# Patient Record
Sex: FEMALE | Race: WHITE | NOT HISPANIC OR LATINO | Employment: FULL TIME | URBAN - NONMETROPOLITAN AREA
[De-identification: names, ages, dates, MRNs, and addresses within clinical notes are randomized per-mention and may not be internally consistent; named-entity substitution may affect disease eponyms.]

---

## 2021-06-28 ENCOUNTER — OFFICE VISIT (OUTPATIENT)
Dept: CARDIOLOGY | Facility: CLINIC | Age: 56
End: 2021-06-28

## 2021-06-28 VITALS
TEMPERATURE: 97 F | HEIGHT: 67 IN | HEART RATE: 61 BPM | RESPIRATION RATE: 12 BRPM | DIASTOLIC BLOOD PRESSURE: 74 MMHG | SYSTOLIC BLOOD PRESSURE: 130 MMHG | BODY MASS INDEX: 40.81 KG/M2 | OXYGEN SATURATION: 92 % | WEIGHT: 260 LBS

## 2021-06-28 DIAGNOSIS — E78.00 HYPERCHOLESTEROLEMIA: Primary | ICD-10-CM

## 2021-06-28 DIAGNOSIS — I10 ESSENTIAL HYPERTENSION: ICD-10-CM

## 2021-06-28 PROCEDURE — 99213 OFFICE O/P EST LOW 20 MIN: CPT | Performed by: INTERNAL MEDICINE

## 2021-06-28 PROCEDURE — 93000 ELECTROCARDIOGRAM COMPLETE: CPT | Performed by: INTERNAL MEDICINE

## 2021-06-28 RX ORDER — HYDROCHLOROTHIAZIDE 50 MG/1
50 TABLET ORAL DAILY
Qty: 90 TABLET | Refills: 3 | Status: SHIPPED | OUTPATIENT
Start: 2021-06-28 | End: 2022-07-19

## 2021-06-28 RX ORDER — LOSARTAN POTASSIUM 100 MG/1
100 TABLET ORAL DAILY
Qty: 90 TABLET | Refills: 3 | Status: SHIPPED | OUTPATIENT
Start: 2021-06-28 | End: 2022-07-11

## 2021-06-28 RX ORDER — ROPINIROLE 0.5 MG/1
0.5 TABLET, FILM COATED ORAL DAILY
COMMUNITY
Start: 2021-04-15

## 2021-06-28 NOTE — PROGRESS NOTES
[facility] [place]  [facility] CARDIOLOGY  [address] [place] [address]  Dept: [phone]  Dept Fax: [phone]    Dana Arroyo  1965    Follow Up Office Visit Note    History of Present Illness:  Dana Arroyo is a 56 y.o. female who presents to the clinic for Follow-up. For Hypertension and Hyperlipidemia --She seems doing well, no complaint, no chest pain, palpitations, her BP is good 125.75 EKG sinus, she has had lab per patent with normal results, no available to me now,     The following portions of the patient's history were reviewed and updated as appropriate: allergies, current medications, past family history, past medical history, past social history, past surgical history and problem list.    Medications:  hydroCHLOROthiazide  losartan  rOPINIRole    Subjective  Allergies   Allergen Reactions   • Penicillins Rash        Past Medical History:   Diagnosis Date   • Benign hypertension    • BMI 37.0-37.9, adult    • Familial hypercholesteremia    • Hypertensive disorder    • Intermittent palpitations    • Mixed hyperlipidemia    • Sleep apnea        History reviewed. No pertinent surgical history.    Family History   Problem Relation Age of Onset   • Hyperlipidemia Mother    • Hypertension Mother    • Stroke Mother         Social History     Socioeconomic History   • Marital status:      Spouse name: Not on file   • Number of children: Not on file   • Years of education: Not on file   • Highest education level: Not on file   Tobacco Use   • Smoking status: Never Smoker   • Smokeless tobacco: Never Used   Substance and Sexual Activity   • Alcohol use: Yes   • Drug use: Never   • Sexual activity: Defer       Review of Systems   All other systems reviewed and are negative.      Cardiovascular Procedures    ECHO/MUGA:   STRESS TESTS:   CARDIAC CATH:   DEVICES:   HOLTER:   CT/MRI:   VASCULAR:   CARDIOTHORACIC:     Objective  Vitals:    06/28/21 1005   BP: 130/74  "  BP Location: Left arm   Patient Position: Sitting   Cuff Size: Adult   Pulse: 61   Resp: 12   Temp: 97 °F (36.1 °C)   TempSrc: Infrared   SpO2: 92%   Weight: 118 kg (260 lb)   Height: 170.2 cm (67\")   PainSc: 0-No pain     Body mass index is 40.72 kg/m².     Physical Exam  Constitutional:       Appearance: Healthy appearance. Not in distress.   Neck:      Vascular: No JVR. JVD normal.   Pulmonary:      Effort: Pulmonary effort is normal.      Breath sounds: Normal breath sounds. No wheezing. No rhonchi. No rales.   Chest:      Chest wall: Not tender to palpatation.   Cardiovascular:      PMI at left midclavicular line. Normal rate. Regular rhythm. Normal S1. Normal S2.      Murmurs: There is no murmur.      No gallop. No click. No rub.   Pulses:     Intact distal pulses.   Edema:     Peripheral edema absent.   Abdominal:      General: Bowel sounds are normal.      Palpations: Abdomen is soft.      Tenderness: There is no abdominal tenderness.   Musculoskeletal: Normal range of motion.         General: No tenderness. Skin:     General: Skin is warm and dry.   Neurological:      General: No focal deficit present.      Mental Status: Alert and oriented to person, place and time.          Diagnostic Data    ECG 12 Lead    Date/Time: 6/28/2021 10:29 AM  Performed by: Omar Shields MD  Authorized by: Omar Shields MD   Comparison: compared with previous ECG   Rhythm: sinus rhythm  Rate: normal  BPM: 71  QRS axis: normal    Clinical impression: normal ECG            Assessment and Plan  Hypertension- The BP is fine, will keep same med's Losartan and HCTZ   Hyperlipidemia - on  No med's she refuses     No follow-ups on file.    Omar Shields MD  06/28/2021  "

## 2022-03-29 ENCOUNTER — OFFICE VISIT (OUTPATIENT)
Dept: CARDIOLOGY | Facility: CLINIC | Age: 57
End: 2022-03-29

## 2022-03-29 VITALS
BODY MASS INDEX: 45.99 KG/M2 | HEART RATE: 60 BPM | DIASTOLIC BLOOD PRESSURE: 74 MMHG | TEMPERATURE: 97.1 F | OXYGEN SATURATION: 97 % | RESPIRATION RATE: 15 BRPM | WEIGHT: 293 LBS | SYSTOLIC BLOOD PRESSURE: 122 MMHG | HEIGHT: 67 IN

## 2022-03-29 DIAGNOSIS — E78.00 HYPERCHOLESTEROLEMIA: ICD-10-CM

## 2022-03-29 DIAGNOSIS — I10 ESSENTIAL HYPERTENSION: Primary | ICD-10-CM

## 2022-03-29 DIAGNOSIS — Z01.810 PREOP CARDIOVASCULAR EXAM: ICD-10-CM

## 2022-03-29 PROCEDURE — 99214 OFFICE O/P EST MOD 30 MIN: CPT | Performed by: INTERNAL MEDICINE

## 2022-03-29 PROCEDURE — 93000 ELECTROCARDIOGRAM COMPLETE: CPT | Performed by: INTERNAL MEDICINE

## 2022-03-29 RX ORDER — DICLOFENAC SODIUM 75 MG/1
75 TABLET, DELAYED RELEASE ORAL 2 TIMES DAILY
COMMUNITY
Start: 2022-03-09 | End: 2022-08-12

## 2022-03-29 NOTE — PROGRESS NOTES
[facility] [place]  [facility] CARDIOLOGY  [address] [place] [address]  Dept: [phone]  Dept Fax: [phone]    Dana Marshall  1965    Follow Up Office Visit Note    History of Present Illness:  aDna Marshall is a 57 y.o. female who presents to the clinic for  Prop cardiovascular evaluation- She denies any complaints, planning to have bariatric surgery, no chest pain, no SOB, BP is 120.80,  Her cardiac exam is normal, EKG sinus normal EKG HR 70, her Lipids are very high LDL is 201, she has refused medical treatment in the past and now, she has ehsan hyercholesterolemia, and advised  again to take meds but she refuses, she is a low risk for any surgical procedures    The following portions of the patient's history were reviewed and updated as appropriate: allergies, current medications, past family history, past medical history, past social history, past surgical history and problem list.    Medications:  diclofenac  hydroCHLOROthiazide  losartan  rOPINIRole    Subjective  Allergies   Allergen Reactions   • Penicillins Rash        Past Medical History:   Diagnosis Date   • Benign hypertension    • BMI 37.0-37.9, adult    • Familial hypercholesteremia    • Hypertensive disorder    • Intermittent palpitations    • Mixed hyperlipidemia    • Sleep apnea        History reviewed. No pertinent surgical history.    Family History   Problem Relation Age of Onset   • Hyperlipidemia Mother    • Hypertension Mother    • Stroke Mother         Social History     Socioeconomic History   • Marital status:    Tobacco Use   • Smoking status: Never Smoker   • Smokeless tobacco: Never Used   Substance and Sexual Activity   • Alcohol use: Yes   • Drug use: Never   • Sexual activity: Defer       Review of Systems   Constitutional: Negative.    HENT: Negative.    Respiratory: Negative.    Cardiovascular: Negative.    Endocrine: Negative.    Genitourinary: Negative.   "  Musculoskeletal: Negative.    Skin: Negative.    Allergic/Immunologic: Negative.    Neurological: Negative.    Hematological: Negative.    Psychiatric/Behavioral: Negative.    All other systems reviewed and are negative.      Cardiovascular Procedures    ECHO/MUGA:   STRESS TESTS:   CARDIAC CATH:   DEVICES:   HOLTER:   CT/MRI:   VASCULAR:   CARDIOTHORACIC:     Objective  Vitals:    03/29/22 0905   BP: 122/74   BP Location: Right arm   Patient Position: Sitting   Cuff Size: Large Adult   Pulse: 60   Resp: 15   Temp: 97.1 °F (36.2 °C)   TempSrc: Infrared   SpO2: 97%   Weight: 133 kg (294 lb)   Height: 170.2 cm (67\")   PainSc: 0-No pain     Body mass index is 46.05 kg/m².     Physical Exam  Constitutional:       Appearance: Healthy appearance. Not in distress.   Neck:      Vascular: No JVR. JVD normal.   Pulmonary:      Effort: Pulmonary effort is normal.      Breath sounds: Normal breath sounds. No wheezing. No rhonchi. No rales.   Chest:      Chest wall: Not tender to palpatation.   Cardiovascular:      PMI at left midclavicular line. Normal rate. Regular rhythm. Normal S1. Normal S2.      Murmurs: There is no murmur.      No gallop. No click. No rub.   Pulses:     Intact distal pulses.   Edema:     Peripheral edema absent.   Abdominal:      General: Bowel sounds are normal.      Palpations: Abdomen is soft.      Tenderness: There is no abdominal tenderness.   Musculoskeletal: Normal range of motion.         General: No tenderness. Skin:     General: Skin is warm and dry.   Neurological:      General: No focal deficit present.      Mental Status: Alert and oriented to person, place and time.          Diagnostic Data    ECG 12 Lead    Date/Time: 3/29/2022 10:38 AM  Performed by: Omar Shields MD  Authorized by: Omar Shields MD   Comparison: compared with previous ECG from 6/28/2021  Similar to previous ECG  Rhythm: sinus rhythm  Rate: normal  BPM: 70  QRS axis: normal  Other findings: low " voltage    Clinical impression: normal ECG            Assessment and Plan  Diagnoses and all orders for this visit:    Essential hypertension- BP is good on Losartan 100 mg and HCTZ  BP is 120.80.     Hypercholesterolemia- Rickey- LDL is 201, refuses medication, risk of stroke and mi, were explained to the patient     Preop cardiovascular exam- She is asymptomatic, BP is good, low risk for any surgical procedure,   Obesity - she is planning to have bariatric surgery    Other orders  -     diclofenac (VOLTAREN) 75 MG EC tablet; Take 75 mg by mouth 2 (Two) Times a Day.         Return in about 1 year (around 3/29/2023) for Recheck.    Omar Shields MD  03/29/2022

## 2022-07-10 DIAGNOSIS — I10 ESSENTIAL HYPERTENSION: ICD-10-CM

## 2022-07-11 RX ORDER — LOSARTAN POTASSIUM 100 MG/1
TABLET ORAL
Qty: 90 TABLET | Refills: 1 | Status: SHIPPED | OUTPATIENT
Start: 2022-07-11 | End: 2022-08-12

## 2022-07-19 DIAGNOSIS — I10 ESSENTIAL HYPERTENSION: ICD-10-CM

## 2022-07-19 RX ORDER — HYDROCHLOROTHIAZIDE 50 MG/1
TABLET ORAL
Qty: 90 TABLET | Refills: 1 | Status: SHIPPED | OUTPATIENT
Start: 2022-07-19 | End: 2022-08-12

## 2022-08-12 ENCOUNTER — OFFICE VISIT (OUTPATIENT)
Dept: CARDIOLOGY | Facility: CLINIC | Age: 57
End: 2022-08-12

## 2022-08-12 VITALS
SYSTOLIC BLOOD PRESSURE: 122 MMHG | TEMPERATURE: 98 F | DIASTOLIC BLOOD PRESSURE: 76 MMHG | HEART RATE: 72 BPM | OXYGEN SATURATION: 98 % | WEIGHT: 275 LBS | HEIGHT: 67 IN | BODY MASS INDEX: 43.16 KG/M2 | RESPIRATION RATE: 16 BRPM

## 2022-08-12 DIAGNOSIS — E78.00 HYPERCHOLESTEROLEMIA: ICD-10-CM

## 2022-08-12 DIAGNOSIS — I10 ESSENTIAL HYPERTENSION: Primary | ICD-10-CM

## 2022-08-12 PROCEDURE — 93000 ELECTROCARDIOGRAM COMPLETE: CPT | Performed by: INTERNAL MEDICINE

## 2022-08-12 PROCEDURE — 99213 OFFICE O/P EST LOW 20 MIN: CPT | Performed by: INTERNAL MEDICINE

## 2022-08-12 RX ORDER — ESTRADIOL 0.1 MG/G
CREAM VAGINAL
COMMUNITY
Start: 2022-07-19

## 2022-08-12 RX ORDER — OMEPRAZOLE 20 MG/1
20 CAPSULE, DELAYED RELEASE ORAL DAILY
COMMUNITY
Start: 2022-07-22 | End: 2023-03-20 | Stop reason: ALTCHOICE

## 2022-08-12 NOTE — PROGRESS NOTES
[facility] [place]  [facility] CARDIOLOGY  [address] [place] [address]  Dept: [phone]  Dept Fax: [phone]    Dana Marshall  1965    Follow Up Office Visit Note    History of Present Illness:  Dana Marshall is a 57 y.o. female who presents to the clinic for Follow-up. Hypertension- She has lost 20 pounds after bariatric surgery, her BP have dropped and she stopped 2 weeks ago, Losartan and HCTZ, her BP here is 120.80, advised not to take the meds for now keep checking the BP     The following portions of the patient's history were reviewed and updated as appropriate: allergies, current medications, past family history, past medical history, past social history, past surgical history and problem list.    Medications:  estradiol  omeprazole  rOPINIRole    Subjective  Allergies   Allergen Reactions   • Penicillins Rash        Past Medical History:   Diagnosis Date   • Benign hypertension    • BMI 37.0-37.9, adult    • Familial hypercholesteremia    • Hypertensive disorder    • Intermittent palpitations    • Mixed hyperlipidemia    • Sleep apnea        History reviewed. No pertinent surgical history.    Family History   Problem Relation Age of Onset   • Hyperlipidemia Mother    • Hypertension Mother    • Stroke Mother         Social History     Socioeconomic History   • Marital status:    Tobacco Use   • Smoking status: Never Smoker   • Smokeless tobacco: Never Used   Substance and Sexual Activity   • Alcohol use: Yes   • Drug use: Never   • Sexual activity: Defer       Review of Systems   Constitutional: Negative.    HENT: Negative.    Respiratory: Negative.    Cardiovascular: Negative.    Endocrine: Negative.    Genitourinary: Negative.    Musculoskeletal: Negative.    Skin: Negative.    Allergic/Immunologic: Negative.    Neurological: Negative.    Hematological: Negative.    Psychiatric/Behavioral: Negative.        Cardiovascular  "Procedures    ECHO/MUGA:   STRESS TESTS:   CARDIAC CATH:   DEVICES:   HOLTER:   CT/MRI:   VASCULAR:   CARDIOTHORACIC:     Objective  Vitals:    08/12/22 0846   BP: 122/76   BP Location: Left arm   Patient Position: Lying   Cuff Size: Adult   Pulse: 72   Resp: 16   Temp: 98 °F (36.7 °C)   TempSrc: Infrared   SpO2: 98%   Weight: 125 kg (275 lb)   Height: 170.2 cm (67\")   PainSc:   6   PainLoc: Back     Body mass index is 43.07 kg/m².     Physical Exam  Constitutional:       Appearance: Healthy appearance. Not in distress.   Neck:      Vascular: No JVR. JVD normal.   Pulmonary:      Effort: Pulmonary effort is normal.      Breath sounds: Normal breath sounds. No wheezing. No rhonchi. No rales.   Chest:      Chest wall: Not tender to palpatation.   Cardiovascular:      PMI at left midclavicular line. Normal rate. Regular rhythm. Normal S1. Normal S2.      Murmurs: There is no murmur.      No gallop. No click. No rub.   Pulses:     Intact distal pulses.   Edema:     Peripheral edema absent.   Abdominal:      General: Bowel sounds are normal.      Palpations: Abdomen is soft.      Tenderness: There is no abdominal tenderness.   Musculoskeletal: Normal range of motion.         General: No tenderness. Skin:     General: Skin is warm and dry.   Neurological:      General: No focal deficit present.      Mental Status: Alert and oriented to person, place and time.          Diagnostic Data    ECG 12 Lead    Date/Time: 8/12/2022 9:19 AM  Performed by: Omar Shields MD  Authorized by: Omar Shields MD   Comparison: compared with previous ECG from 3/29/2022  Rhythm: sinus rhythm and sinus bradycardia  Rate: bradycardic  BPM: 57  QRS axis: normal    Clinical impression: normal ECG            Assessment and Plan  Diagnoses and all orders for this visit:    Essential hypertension- The BP is 120.80,will d.,c all her meds for now, will check the BP daily    Hypercholesterolemia- ehsan, she refuses to take any " meds  Palpitations- She has had a Holter with some abnormalities few years ago, will try to get those records, she has very occasional some palpitations    Other orders  -     omeprazole (priLOSEC) 20 MG capsule; Take 20 mg by mouth Daily.  -     estradiol (ESTRACE) 0.1 MG/GM vaginal cream; APPLY PEA SIZE AMOUNT TO VAGINAL OPENING AT BEDTIME FOR 2 WEEKS THEN GO TO TWICE WEEKLY         Return in about 6 months (around 2/12/2023) for Recheck.    Omar Shields MD  08/12/2022

## 2023-03-20 ENCOUNTER — OFFICE VISIT (OUTPATIENT)
Dept: CARDIOLOGY | Facility: CLINIC | Age: 58
End: 2023-03-20
Payer: COMMERCIAL

## 2023-03-20 ENCOUNTER — TELEPHONE (OUTPATIENT)
Dept: CARDIOLOGY | Facility: CLINIC | Age: 58
End: 2023-03-20

## 2023-03-20 VITALS
HEART RATE: 71 BPM | RESPIRATION RATE: 16 BRPM | DIASTOLIC BLOOD PRESSURE: 74 MMHG | HEIGHT: 67 IN | WEIGHT: 247 LBS | SYSTOLIC BLOOD PRESSURE: 128 MMHG | TEMPERATURE: 98 F | BODY MASS INDEX: 38.77 KG/M2 | OXYGEN SATURATION: 98 %

## 2023-03-20 DIAGNOSIS — R73.03 PREDIABETES: ICD-10-CM

## 2023-03-20 DIAGNOSIS — E78.00 HYPERCHOLESTEROLEMIA: ICD-10-CM

## 2023-03-20 DIAGNOSIS — I10 ESSENTIAL HYPERTENSION: Primary | ICD-10-CM

## 2023-03-20 PROCEDURE — 99213 OFFICE O/P EST LOW 20 MIN: CPT

## 2023-03-20 RX ORDER — HYDROCHLOROTHIAZIDE 50 MG/1
50 TABLET ORAL DAILY
Qty: 90 TABLET | Refills: 2 | Status: SHIPPED | OUTPATIENT
Start: 2023-03-20

## 2023-03-20 RX ORDER — HYDROCHLOROTHIAZIDE 50 MG/1
1 TABLET ORAL DAILY
COMMUNITY
Start: 2023-03-02 | End: 2023-03-20 | Stop reason: SDUPTHER

## 2023-03-20 RX ORDER — ASPIRIN 81 MG/1
81 TABLET ORAL DAILY
COMMUNITY

## 2023-03-20 NOTE — TELEPHONE ENCOUNTER
----- Message from ELIAS Sebastian sent at 3/20/2023 10:28 AM EDT -----  Received UPDATED labs 2/6/23    AIC is better 5.5 for diabetes    Also her Hg level has improved from previous lab collection.     Her cholesterol is better but still elevated,  down from >200 a year ago. It is possible we will continue to see improvement in this number with continued weight loss. Continue lifestyle modifications and will watch.       ----- Message -----  From: Edwige Hall MA  Sent: 3/20/2023  10:26 AM EDT  To: ELIAS Sebastian      ----- Message -----  From: Syl Thomson APRN  Sent: 3/20/2023   9:50 AM EDT  To: Edwige Hall MA    I was able to retrieve her labs from September 2022    Her Hg at that time was low 10.6, she will need to follow with PCP or bariatrics for this.     LDL cholesterol is better now 137, was > 200 so much improved, however triglycerides still elevated 173, need to be less than 150. We should see some improvement with continued weight loss    Her A1C for diabetes is now worse, now she is prediabetic 5.8, was 5.5 earlier in year. Also we should see some improvement with this with continued weight loss.

## 2023-03-20 NOTE — PROGRESS NOTES
[facility] [place]  [facility] CARDIOLOGY  [address] [place] [address]  Dept: [phone]  Dept Fax: [phone]    Dana Marshall  1965    Follow Up Office Visit Note    History of Present Illness:  Dana Marshall is a 57 y.o. female who presents to the clinic for Follow-up HTN, HLP. She denies major cardiac complaints today, she did attests to having transient LE mild edema and has continued taking HCTZ about 5 days weekly, state BP has been good at home around 120's systolic, also congruent with today's reading 122/70 on recheck. She has lost 60 total pounds and 30 in the last 6 months. She states to have had recent labs about a month ago. Will attempt to retrieve from [email or web address]. Physical exam today is good. Trace LE swelling. At this time continue current management. Follow 6 months or sooner as indicated.     External labs reviewed [facility] 9/29/22- Hg 10.6, LDL better 137 from 201, trig 173, A1C 5.8    Received UPDATED labs 2/6/23, AIC is better 5.5 for diabetes, Also her Hg level has improved from previous lab collection. Her cholesterol is better but still elevated,  down from >200 a year ago.    The following portions of the patient's history were reviewed and updated as appropriate: allergies, current medications, past family history, past medical history, past social history, past surgical history, and problem list.    Medications:  aspirin  estradiol  hydroCHLOROthiazide  rOPINIRole    Subjective  Allergies   Allergen Reactions   • Penicillins Rash        Past Medical History:   Diagnosis Date   • Benign hypertension    • BMI 37.0-37.9, adult    • Familial hypercholesteremia    • Hypertensive disorder    • Intermittent palpitations    • Mixed hyperlipidemia    • Sleep apnea        History reviewed. No pertinent surgical history.    Family History   Problem Relation Age of Onset   • Hyperlipidemia Mother    • Hypertension Mother    • Stroke Mother  "        Social History     Socioeconomic History   • Marital status:    Tobacco Use   • Smoking status: Never   • Smokeless tobacco: Never   Vaping Use   • Vaping Use: Never used   Substance and Sexual Activity   • Alcohol use: Yes   • Drug use: Never   • Sexual activity: Defer       Review of Systems   Cardiovascular: Positive for leg swelling.   All other systems reviewed and are negative.      Cardiovascular Procedures    ECHO/MUGA:   STRESS TESTS:   CARDIAC CATH:   DEVICES:   HOLTER:   CT/MRI:   VASCULAR:   CARDIOTHORACIC:     Objective  Vitals:    03/20/23 0850   BP: 128/74   BP Location: Right arm   Patient Position: Sitting   Cuff Size: Adult   Pulse: 71   Resp: 16   Temp: 98 °F (36.7 °C)   TempSrc: Infrared   SpO2: 98%   Weight: 112 kg (247 lb)   Height: 170.2 cm (67\")   PainSc:   3   PainLoc: Back     Body mass index is 38.69 kg/m².     Physical Exam  Vitals reviewed.   Constitutional:       General: Awake.      Appearance: Healthy appearance. Not in distress.   Neck:      Vascular: No JVR. JVD normal.   Pulmonary:      Effort: Pulmonary effort is normal.      Breath sounds: Normal breath sounds. No wheezing. No rhonchi. No rales.   Chest:      Chest wall: Not tender to palpatation.   Cardiovascular:      PMI at left midclavicular line. Normal rate. Regular rhythm. Normal S1. Normal S2.      Murmurs: There is no murmur.      No gallop. No click. No rub.   Pulses:     Intact distal pulses.   Edema:     Pretibial: bilateral trace edema of the pretibial area.     Ankle: bilateral trace edema of the ankle.  Abdominal:      General: Bowel sounds are normal.      Palpations: Abdomen is soft.      Tenderness: There is no abdominal tenderness.   Musculoskeletal: Normal range of motion.         General: No tenderness. Skin:     General: Skin is warm and dry.   Neurological:      General: No focal deficit present.      Mental Status: Alert and oriented to person, place and time.   Psychiatric:         " Behavior: Behavior is cooperative.          Diagnostic Data  Procedures    Advance Care Planning          Assessment and Plan  Diagnoses and all orders for this visit:    1. Essential hypertension (Primary)  Stable on HCTZ 50 about 5 days weekly. BP today 122/70 on recheck, also congruent home readings even with HCTZ. Continue at this time, may need to adjust with continued weight loss. Will review external labs.   -     hydroCHLOROthiazide (HYDRODIURIL) 50 MG tablet; Take 1 tablet by mouth Daily.  Dispense: 90 tablet; Refill: 2    2. Hypercholesterolemia  No recent reading available,  March 2022, not agreeable for medications at that time. Will review recent labs, may need Lipid. Further recommendation pending review.     3. Prediabetic  Follow with PCP    Return in about 6 months (around 9/20/2023) for Recheck, Dr. Shields.    Syl Thomson, APRN  03/20/2023    Part of this note may be an electronic transcription/translation of spoken language to printed text using the Dragon Dictation System.

## 2023-06-02 ENCOUNTER — OFFICE VISIT (OUTPATIENT)
Dept: CARDIOLOGY | Facility: CLINIC | Age: 58
End: 2023-06-02

## 2023-06-02 VITALS
DIASTOLIC BLOOD PRESSURE: 66 MMHG | HEIGHT: 67 IN | TEMPERATURE: 98 F | OXYGEN SATURATION: 98 % | SYSTOLIC BLOOD PRESSURE: 114 MMHG | BODY MASS INDEX: 38.77 KG/M2 | HEART RATE: 84 BPM | WEIGHT: 247 LBS | RESPIRATION RATE: 18 BRPM

## 2023-06-02 DIAGNOSIS — R73.03 PREDIABETES: ICD-10-CM

## 2023-06-02 DIAGNOSIS — I10 ESSENTIAL HYPERTENSION: Primary | ICD-10-CM

## 2023-06-02 DIAGNOSIS — R55 POSTURAL DIZZINESS WITH PRESYNCOPE: ICD-10-CM

## 2023-06-02 DIAGNOSIS — R42 POSTURAL DIZZINESS WITH PRESYNCOPE: ICD-10-CM

## 2023-06-02 DIAGNOSIS — Z86.79 HISTORY OF ATRIAL FIBRILLATION: ICD-10-CM

## 2023-06-02 DIAGNOSIS — E78.00 HYPERCHOLESTEROLEMIA: ICD-10-CM

## 2023-06-02 DIAGNOSIS — R06.02 SHORTNESS OF BREATH: ICD-10-CM

## 2023-06-02 PROCEDURE — 93000 ELECTROCARDIOGRAM COMPLETE: CPT

## 2023-06-02 PROCEDURE — 99214 OFFICE O/P EST MOD 30 MIN: CPT

## 2023-06-02 NOTE — PROGRESS NOTES
Venipuncture Blood Specimen Collection  Venipuncture performed in CLINIC by Gaby Johnson MA with good hemostasis. Patient tolerated the procedure well without complications.   06/02/23   Gaby Johnson MA

## 2023-06-02 NOTE — PROGRESS NOTES
[facility] [place]  [facility] CARDIOLOGY  [address] [place] [address]  Dept: [phone]  Dept Fax: [phone]    Dana Marshall  1965    Follow Up Office Visit Note    History of Present Illness:  Dana Marshall is a 58 y.o. female who presents to the clinic for Follow-up.  She presents today with complaints of 2-day history of feeling dizzy, nauseated and shortness of breath she noticed while at [facility], states these episodes are transient and not necessarily associated with postural positioning.  Have occurred at times of rest and with standing.  She states to be hydrating well, denies any recent viral or bacterial etiology.  At this moment she denies additional complaints, no CP.  He does have transient edema she states occurs after sitting for long periods of time on HCTZ 50 daily and taking about 5 days weekly.  Her ECG today is normal without change from historical record.  Physical exam today is normal.  Negative orthostatics, 122/80 on recheck sitting, 110/75 standing.  At this moment she did state she has had a history of atrial fibrillation, no record available for confirmation.  There is suspicion that she is having transient episodes, will place Holter, get echo for further evaluation of her above complaints.  Also will check labs today.  Advised to continue hydrating well, hold water pill for now pending labs.    The following portions of the patient's history were reviewed and updated as appropriate: allergies, current medications, past family history, past medical history, past social history, past surgical history, and problem list.    Medications:  aspirin  estradiol  hydroCHLOROthiazide  rOPINIRole    Subjective  Allergies   Allergen Reactions    Penicillins Rash        Past Medical History:   Diagnosis Date    Benign hypertension     BMI 37.0-37.9, adult     Familial hypercholesteremia     Hypertensive disorder     Intermittent  "palpitations     Mixed hyperlipidemia     Sleep apnea        History reviewed. No pertinent surgical history.    Family History   Problem Relation Age of Onset    Hyperlipidemia Mother     Hypertension Mother     Stroke Mother         Social History     Socioeconomic History    Marital status:    Tobacco Use    Smoking status: Never    Smokeless tobacco: Never   Vaping Use    Vaping Use: Never used   Substance and Sexual Activity    Alcohol use: Yes    Drug use: Never    Sexual activity: Defer       Review of Systems   Constitutional: Positive for fatigue.   Respiratory: Positive for shortness of breath.    Cardiovascular: Positive for leg swelling.   Gastrointestinal: Positive for nausea.   Neurological: Positive for dizziness.   All other systems reviewed and are negative.      Cardiovascular Procedures    ECHO/MUGA:   STRESS TESTS:   CARDIAC CATH:   DEVICES:   HOLTER:   CT/MRI:   VASCULAR:   CARDIOTHORACIC:     Objective  Vitals:    06/02/23 1045   BP: 114/66   BP Location: Right arm   Patient Position: Sitting   Cuff Size: Adult   Pulse: 84   Resp: 18   Temp: 98 °F (36.7 °C)   TempSrc: Infrared   SpO2: 98%   Weight: 112 kg (247 lb)   Height: 170.2 cm (67\")   PainSc:   1   PainLoc: Toe     Body mass index is 38.69 kg/m².     Physical Exam  Vitals reviewed.   Constitutional:       General: Awake.      Appearance: Normal and healthy appearance. Not in distress.   Neck:      Vascular: No JVR. JVD normal.   Pulmonary:      Effort: Pulmonary effort is normal.      Breath sounds: Normal breath sounds. No wheezing. No rhonchi. No rales.   Chest:      Chest wall: Not tender to palpatation.   Cardiovascular:      PMI at left midclavicular line. Normal rate. Regular rhythm. Normal S1. Normal S2.      Murmurs: There is no murmur.      No gallop. No click. No rub.   Pulses:     Intact distal pulses.   Edema:     Peripheral edema absent.   Abdominal:      General: Bowel sounds are normal.      Palpations: Abdomen is " soft.      Tenderness: There is no abdominal tenderness.   Musculoskeletal: Normal range of motion.         General: No tenderness. Skin:     General: Skin is warm and dry.   Neurological:      General: No focal deficit present.      Mental Status: Alert and oriented to person, place and time.   Psychiatric:         Behavior: Behavior is cooperative.          Diagnostic Data    ECG 12 Lead    Date/Time: 6/2/2023 11:31 AM  Performed by: Syl Thomson APRN  Authorized by: Syl Thomson APRN   Comparison: compared with previous ECG from 8/12/2022  Similar to previous ECG  Rate: normal  QRS axis: normal    Clinical impression: normal ECG            Advance Care Planning          Assessment and Plan  Diagnoses and all orders for this visit:    1. Essential hypertension (Primary)  On HCTZ 50, she has been taking about 5 days weekly.  BP today is good 122/80 sitting, negative orthostatics 110/75 standing.  This time advised to hold water pill due to her above complaints.  Will check labs, with further recommendation pending result.  -     TSH Rfx On Abnormal To Free T4    2. Hypercholesterolemia  LDL has improved 150 2 February 2023, was greater than 200 previously.  Triglycerides still elevated 188.  Nutritional guidance.  -     Lipid Panel    3. Prediabetes  A1c is now normalized 5.5.  She has lost significant weight, over 60 pounds and 30 since August    4. Postural dizziness with presyncope  Complaint as above.  Negative orthostatics today.  Denies vomiting, diarrhea.  She is eating at a caloric deficit, had bariatric surgery last year.  We will check labs.  Advised to hold water pill.  She does have history of paroxysmal atrial fibrillation, will get Holter today.  -     Holter Monitor - 48 Hour  -     Adult Transthoracic Echo Complete W/ Cont if Necessary Per Protocol; Future  -     Comprehensive Metabolic Panel  -     TSH Rfx On Abnormal To Free T4    5. History of atrial fibrillation  Patient  statement, no record confirmation.  Will get 48-hour Holter today.  TSH  -     Adult Transthoracic Echo Complete W/ Cont if Necessary Per Protocol; Future    6. Shortness of breath  Complaint as above, inconsistent, states sometimes at rest also sometimes with activity.  Lungs sound good today.  Denies any recent viral, URI, and this.  Will get echo.  -     Adult Transthoracic Echo Complete W/ Cont if Necessary Per Protocol; Future  -     Comprehensive Metabolic Panel  -     TSH Rfx On Abnormal To Free T4  -     ECG 12 Lead           Return in about 1 week (around 6/9/2023) for Recheck, Syl GRIFFIN.    ELIAS Sebastian  06/02/2023    Part of this note may be an electronic transcription/translation of spoken language to printed text using the Dragon Dictation System.

## 2023-06-03 LAB
ALBUMIN SERPL-MCNC: 4.5 G/DL (ref 3.8–4.9)
ALBUMIN/GLOB SERPL: 1.7 {RATIO} (ref 1.2–2.2)
ALP SERPL-CCNC: 122 IU/L (ref 44–121)
ALT SERPL-CCNC: 21 IU/L (ref 0–32)
AST SERPL-CCNC: 20 IU/L (ref 0–40)
BILIRUB SERPL-MCNC: 0.5 MG/DL (ref 0–1.2)
BUN SERPL-MCNC: 29 MG/DL (ref 6–24)
BUN/CREAT SERPL: 32 (ref 9–23)
CALCIUM SERPL-MCNC: 10.3 MG/DL (ref 8.7–10.2)
CHLORIDE SERPL-SCNC: 102 MMOL/L (ref 96–106)
CHOLEST SERPL-MCNC: 251 MG/DL (ref 100–199)
CO2 SERPL-SCNC: 26 MMOL/L (ref 20–29)
CREAT SERPL-MCNC: 0.91 MG/DL (ref 0.57–1)
EGFRCR SERPLBLD CKD-EPI 2021: 73 ML/MIN/1.73
GLOBULIN SER CALC-MCNC: 2.6 G/DL (ref 1.5–4.5)
GLUCOSE SERPL-MCNC: 107 MG/DL (ref 70–99)
HDLC SERPL-MCNC: 62 MG/DL
LDLC SERPL CALC-MCNC: 159 MG/DL (ref 0–99)
POTASSIUM SERPL-SCNC: 3.5 MMOL/L (ref 3.5–5.2)
PROT SERPL-MCNC: 7.1 G/DL (ref 6–8.5)
SODIUM SERPL-SCNC: 145 MMOL/L (ref 134–144)
TRIGL SERPL-MCNC: 166 MG/DL (ref 0–149)
TSH SERPL DL<=0.005 MIU/L-ACNC: 0.91 UIU/ML (ref 0.45–4.5)
VLDLC SERPL CALC-MCNC: 30 MG/DL (ref 5–40)

## 2023-06-05 LAB
MAXIMAL PREDICTED HEART RATE: 162 BPM
STRESS TARGET HR: 138 BPM

## 2023-06-06 ENCOUNTER — TELEPHONE (OUTPATIENT)
Dept: CARDIOLOGY | Facility: CLINIC | Age: 58
End: 2023-06-06
Payer: COMMERCIAL

## 2023-06-06 NOTE — TELEPHONE ENCOUNTER
Phone call to pt and she verbally understood and did mention she may come in at later time fasting and redo labs, she does not want to start a statin at this time.

## 2023-06-06 NOTE — TELEPHONE ENCOUNTER
----- Message from ELIAS Sebastian sent at 6/6/2023  8:48 AM EDT -----  Her glucose was elevated, also had some marginally elevated labs, she could be alittle volume depleted so tell her to drink plenty.     Her cholesterol is elevated still LDL is 159, and also her triglycerides are 166. Although these have improved from historical they are still not at goal. Considering her history  of likely familial hypercholesterolemia I would recommend a statin, seems she had LDL > 200 in the past. However if she does not want to try a cholesterol lowering agent we can continue to watch. The higher her LDL this will increase her risk of future cardiac events.     Her thyroid was normal    Her Holter results showed some episodes of a benign atrial tachycardia, she did not have complaints at that time. No atrial fibrillation was noted. Her complaints of dizziness correlated with a normal rhythm. I would call her Holter relatively normal. We can discuss in further detail at follow up.

## 2023-06-08 ENCOUNTER — TELEPHONE (OUTPATIENT)
Dept: CARDIOLOGY | Facility: CLINIC | Age: 58
End: 2023-06-08
Payer: COMMERCIAL